# Patient Record
Sex: FEMALE | Race: BLACK OR AFRICAN AMERICAN | NOT HISPANIC OR LATINO | URBAN - METROPOLITAN AREA
[De-identification: names, ages, dates, MRNs, and addresses within clinical notes are randomized per-mention and may not be internally consistent; named-entity substitution may affect disease eponyms.]

---

## 2017-06-21 ENCOUNTER — HOSPITAL ENCOUNTER (OUTPATIENT)
Dept: RADIOLOGY | Facility: MEDICAL CENTER | Age: 47
End: 2017-06-21
Attending: OBSTETRICS & GYNECOLOGY
Payer: COMMERCIAL

## 2017-06-21 DIAGNOSIS — Z12.31 VISIT FOR SCREENING MAMMOGRAM: ICD-10-CM

## 2017-06-21 PROCEDURE — G0202 SCR MAMMO BI INCL CAD: HCPCS

## 2017-07-07 ENCOUNTER — OFFICE VISIT (OUTPATIENT)
Dept: URGENT CARE | Facility: PHYSICIAN GROUP | Age: 47
End: 2017-07-07
Payer: COMMERCIAL

## 2017-07-07 VITALS
WEIGHT: 165 LBS | OXYGEN SATURATION: 98 % | HEIGHT: 67 IN | HEART RATE: 78 BPM | RESPIRATION RATE: 16 BRPM | TEMPERATURE: 98.4 F | DIASTOLIC BLOOD PRESSURE: 78 MMHG | BODY MASS INDEX: 25.9 KG/M2 | SYSTOLIC BLOOD PRESSURE: 116 MMHG

## 2017-07-07 DIAGNOSIS — T14.8XXA ABRASION OF SKIN: ICD-10-CM

## 2017-07-07 PROCEDURE — 99203 OFFICE O/P NEW LOW 30 MIN: CPT | Performed by: PHYSICIAN ASSISTANT

## 2017-07-07 RX ORDER — TRIAMCINOLONE ACETONIDE 1 MG/G
CREAM TOPICAL
Qty: 1 TUBE | Refills: 0 | Status: SHIPPED | OUTPATIENT
Start: 2017-07-07

## 2017-07-07 ASSESSMENT — ENCOUNTER SYMPTOMS
CHILLS: 0
FEVER: 0
ABDOMINAL PAIN: 0
DIZZINESS: 0
SHORTNESS OF BREATH: 0
NAUSEA: 0
DIARRHEA: 0
ROS SKIN COMMENTS: SKIN ABRASION
MUSCULOSKELETAL NEGATIVE: 1
COUGH: 0
VOMITING: 0
HEADACHES: 0

## 2017-07-07 NOTE — MR AVS SNAPSHOT
"Lynn Melgar   2017 12:45 PM   Office Visit   MRN: [id number]    Department:  [facility]   Dept Phone:  [phone]    Description:  Female : 1970   Provider:  Lily Goyal PA-C           Reason for Visit     Foreign Body in Skin poss FB on neck since       Allergies as of 2017     No Known Allergies      You were diagnosed with     Dermatitis   [226997]         Vital Signs     Blood Pressure Pulse Temperature Respirations Height Weight    116/78 mmHg 78 36.9 °C (98.4 °F) 16 1.702 m (5' 7\") 74.844 kg (165 lb)    Body Mass Index Oxygen Saturation Smoking Status             25.84 kg/m2 98% Never Smoker          Basic Information     Date Of Birth Sex Race Ethnicity Preferred Language    1970 Female Black or  Non- English      Your appointments     Aug 11, 2017 10:30 AM   Annual Exam with Brigette Gould M.D.   [facility] [address]  [facility] [address]   [phone]              Health Maintenance        Date Due Completion Dates    IMM DTaP/Tdap/Td Vaccine (1 - Tdap) 3/10/1987 ---    IMM INFLUENZA (1) 2017 ---    MAMMOGRAM 2018, 2016, 2015    PAP SMEAR 2019, 2015            Current Immunizations     No immunizations on file.      Below and/or attached are the medications your provider expects you to take. Review all of your home medications and newly ordered medications with your provider and/or pharmacist. Follow medication instructions as directed by your provider and/or pharmacist. Please keep your medication list with you and share with your provider. Update the information when medications are discontinued, doses are changed, or new medications (including over-the-counter products) are added; and carry medication information at all times in the event of emergency situations     Allergies:  No Known Allergies          "   Medications  Valid as of: July 07, 2017 -  1:22 PM    Generic Name Brand Name Tablet Size Instructions for use    Triamcinolone Acetonide (Cream) KENALOG 0.1 % Apply thin film to affected areas twice daily for 1-2 weeks or until resolved        .                 Medicines prescribed today were sent to:     [facility] [id number] [place] [address], [place] - [address] [facility] [address] AT [address] [place] [address]    Phone: [phone] Fax: [phone]    Open 24 Hours?: No      Medication refill instructions:       If your prescription bottle indicates you have medication refills left, it is not necessary to call your provider’s office. Please contact your pharmacy and they will refill your medication.    If your prescription bottle indicates you do not have any refills left, you may request refills at any time through one of the following ways: The online CentrePath system (except Urgent Care), by calling your provider’s office, or by asking your pharmacy to contact your provider’s office with a refill request. Medication refills are processed only during regular business hours and may not be available until the next business day. Your provider may request additional information or to have a follow-up visit with you prior to refilling your medication.   *Please Note: Medication refills are assigned a new Rx number when refilled electronically. Your pharmacy may indicate that no refills were authorized even though a new prescription for the same medication is available at the pharmacy. Please request the medicine by name with the pharmacy before contacting your provider for a refill.           CentrePath Access Code: [email or web address]  Expires: 8/6/2017  1:22 PM    CentrePath  A secure, online tool to manage your health information     [employer]’s CentrePath® is a secure, online tool that connects you to your personalized health information from the privacy of your home -- day or night - making  it very easy for you to manage your healthcare. Once the activation process is completed, you can even access your medical information using the "[employer]" nelli, which is available for free in the Apple Nelli store or Google Play store.     "[employer]" provides the following levels of access (as shown below):   My Chart Features   [facility] Doctor [facility]  Specialists [facility]  Urgent  Care Non-[facility]  Primary Care  Doctor   Email your healthcare team securely and privately 24/7 X X X    Manage appointments: schedule your next appointment; view details of past/upcoming appointments X      Request prescription refills. X      View recent personal medical records, including lab and immunizations X X X X   View health record, including health history, allergies, medications X X X X   Read reports about your outpatient visits, procedures, consult and ER notes X X X X   See your discharge summary, which is a recap of your hospital and/or ER visit that includes your diagnosis, lab results, and care plan. X X       How to register for "[employer]":  1. Go to  [email or web address].  2. Click on the Sign Up Now box, which takes you to the New Member Sign Up page. You will need to provide the following information:  a. Enter your "[employer]" Access Code exactly as it appears at the top of this page. (You will not need to use this code after you’ve completed the sign-up process. If you do not sign up before the expiration date, you must request a new code.)   b. Enter your date of birth.   c. Enter your home email address.   d. Click Submit, and follow the next screen’s instructions.  3. Create a "[employer]" ID. This will be your "[employer]ie[employer]" login ID and cannot be changed, so think of one that is secure and easy to remember.  4. Create a "[employer]" password. You can change your password at any time.  5. Enter your Password Reset Question and Answer. This can be used at a later time if you forget your password.   6. Enter your e-mail address.  This allows you to receive e-mail notifications when new information is available in Voxy.  7. Click Sign Up. You can now view your health information.    For assistance activating your Voxy account, call [phone]

## 2017-07-07 NOTE — PROGRESS NOTES
"Subjective:      Lynn Melgar is a 47 y.o. female who presents with Foreign Body in Skin        Patient is accompanied by her .     Foreign Body in Skin  Pertinent negatives include no abdominal pain, chest pain, chills, coughing, fever, headaches, nausea or vomiting.   Patient presents to urgent care reporting an unusual skin lesion on her anterior neck. She states she first noticed in 1 weeks ago and described it as a \"scab\", and believes she scratched herself with something. Over the past week she believes the area has been extending down towards the chest and fells as if there is a foreign body in the skin. She also expresses concern for a parasite. [place] being out in the woods hiking or swimming in lakes. She is worried because she hugged relative that she hasn't seen in a while. No known skin conditions. Denies any pain, redness, or swelling to the site. Denies any recent trauma to the area.     Review of Systems   Constitutional: Negative for fever and chills.   Respiratory: Negative for cough and shortness of breath.    Cardiovascular: Negative for chest pain.   Gastrointestinal: Negative for nausea, vomiting, abdominal pain and diarrhea.   Genitourinary: Negative.    Musculoskeletal: Negative.    Skin:        Skin abrasion   Neurological: Negative for dizziness and headaches.          Objective:     /78 mmHg  Pulse 78  Temp(Src) 36.9 °C (98.4 °F)  Resp 16  Ht 1.702 m (5' 7\")  Wt 74.844 kg (165 lb)  BMI 25.84 kg/m2  SpO2 98%     Physical Exam   Constitutional: She is oriented to person, place, and time. She appears well-developed and well-nourished. No distress.   HENT:   Head: Normocephalic and atraumatic.   Eyes: Conjunctivae are normal. Pupils are equal, round, and reactive to light. Right eye exhibits no discharge. Left eye exhibits no discharge.   Neck: Normal range of motion.   Cardiovascular: Normal rate, regular rhythm and " "normal heart sounds.    No murmur heard.  Pulmonary/Chest: Effort normal.   Musculoskeletal: Normal range of motion.   Neurological: She is alert and oriented to person, place, and time.   Skin: Skin is warm and dry. She is not diaphoretic.        Very superficial, upside down \"V\" shaped abrasion present on anterior neck. No surrounding erythema, edema or discharge from the site. No obvious foreign bodies noted. No overlying scaling. Non-tender.    Psychiatric: She has a normal mood and affect. Her behavior is normal.   Nursing note and vitals reviewed.           Medications, Allergies, and current problem list reviewed today in Epic     Assessment/Plan:     1. Abrasion of skin    - triamcinolone acetonide (KENALOG) 0.1 % Cream; Apply thin film to affected areas twice daily for 1-2 weeks or until resolved  Dispense: 1 Tube; Refill: 0    Advised patient I have a very low suspicion of foreign body in the skin due to lack of history being outside or having any sort of trauma to the area. It appears to be a superficial linear abrasion without any signs of infection. No sign of fungal or parasitic infection. Advised patient there is no indication to perform an ultrasound at this time. Will try trial of topical steroids to monitor response. RTC in 1-2 weeks if lesion appears to be \"growing\" or worsening. Encouraged to take a picture of the area today for comparison in the future if needed. The patient demonstrated a good understanding and agreed with the treatment plan.      "

## 2017-08-11 ENCOUNTER — GYNECOLOGY VISIT (OUTPATIENT)
Dept: OBGYN | Facility: CLINIC | Age: 47
End: 2017-08-11
Payer: COMMERCIAL

## 2017-08-11 ENCOUNTER — HOSPITAL ENCOUNTER (OUTPATIENT)
Facility: MEDICAL CENTER | Age: 47
End: 2017-08-11
Attending: OBSTETRICS & GYNECOLOGY
Payer: COMMERCIAL

## 2017-08-11 VITALS
SYSTOLIC BLOOD PRESSURE: 110 MMHG | HEIGHT: 67 IN | WEIGHT: 175 LBS | DIASTOLIC BLOOD PRESSURE: 70 MMHG | BODY MASS INDEX: 27.47 KG/M2

## 2017-08-11 DIAGNOSIS — Z01.419 WELL FEMALE EXAM WITH ROUTINE GYNECOLOGICAL EXAM: ICD-10-CM

## 2017-08-11 DIAGNOSIS — Z12.4 SCREENING FOR MALIGNANT NEOPLASM OF CERVIX: ICD-10-CM

## 2017-08-11 DIAGNOSIS — Z11.51 SCREENING FOR HUMAN PAPILLOMAVIRUS: ICD-10-CM

## 2017-08-11 PROCEDURE — 87624 HPV HI-RISK TYP POOLED RSLT: CPT

## 2017-08-11 PROCEDURE — 88175 CYTOPATH C/V AUTO FLUID REDO: CPT

## 2017-08-11 PROCEDURE — 99396 PREV VISIT EST AGE 40-64: CPT | Performed by: OBSTETRICS & GYNECOLOGY

## 2017-08-11 NOTE — MR AVS SNAPSHOT
"Lynn Melgar   2017 10:30 AM   Gynecology Visit   MRN: [id number]    Department:  [facility]   Dept Phone:  [phone]    Description:  Female : 1970   Provider:  Brigette Gould M.D.           Reason for Visit     Annual Exam           Allergies as of 2017     No Known Allergies      You were diagnosed with     Well female exam with routine gynecological exam   [375526]       Screening for malignant neoplasm of cervix   [073785]       Screening for human papillomavirus   [346822]         Vital Signs     Blood Pressure Height Weight Body Mass Index Last Menstrual Period Breastfeeding?    110/70 mmHg 1.702 m (5' 7\") 79.379 kg (175 lb) 27.40 kg/m2 2017 No    Smoking Status                   Never Smoker            Basic Information     Date Of Birth Sex Race Ethnicity Preferred Language    1970 Female Black or  Non- English      Health Maintenance        Date Due Completion Dates    IMM DTaP/Tdap/Td Vaccine (1 - Tdap) 3/10/1987 ---    IMM INFLUENZA (1) 2017 ---    MAMMOGRAM 2018, 2016, 2015    PAP SMEAR 2019, 2015            Current Immunizations     No immunizations on file.      Below and/or attached are the medications your provider expects you to take. Review all of your home medications and newly ordered medications with your provider and/or pharmacist. Follow medication instructions as directed by your provider and/or pharmacist. Please keep your medication list with you and share with your provider. Update the information when medications are discontinued, doses are changed, or new medications (including over-the-counter products) are added; and carry medication information at all times in the event of emergency situations     Allergies:  No Known Allergies          Medications  Valid as of: 2017 - 11:10 AM    Generic Name Brand Name Tablet Size Instructions for use   " Triamcinolone Acetonide (Cream) KENALOG 0.1 % Apply thin film to affected areas twice daily for 1-2 weeks or until resolved        .                 Medicines prescribed today were sent to:     [facility] [id number]  [place], [place] - [address] [facility] [address] AT [facility]    [address] [facility] [place] [address]    Phone: [phone] Fax: [phone]    Open 24 Hours?: No      Medication refill instructions:       If your prescription bottle indicates you have medication refills left, it is not necessary to call your provider’s office. Please contact your pharmacy and they will refill your medication.    If your prescription bottle indicates you do not have any refills left, you may request refills at any time through one of the following ways: The online SCM-GL system (except Urgent Care), by calling your provider’s office, or by asking your pharmacy to contact your provider’s office with a refill request. Medication refills are processed only during regular business hours and may not be available until the next business day. Your provider may request additional information or to have a follow-up visit with you prior to refilling your medication.   *Please Note: Medication refills are assigned a new Rx number when refilled electronically. Your pharmacy may indicate that no refills were authorized even though a new prescription for the same medication is available at the pharmacy. Please request the medicine by name with the pharmacy before contacting your provider for a refill.        Your To Do List     Future Labs/Procedures Complete By Expires    THINPREP PAP WITH HPV  As directed 8/11/2018         SCM-GL Access Code: [email or web address]  Expires: 9/8/2017  4:10 AM    SCM-GL  A secure, online tool to manage your health information     [employer]’s SCM-GL® is a secure, online tool that connects you to your personalized health information from the privacy of your home -- day or night - making  it very easy for you to manage your healthcare. Once the activation process is completed, you can even access your medical information using the RevTrax nelli, which is available for free in the Apple Nelli store or Google Play store.     RevTrax provides the following levels of access (as shown below):   My Chart Features   [facility] Doctor [facility]  Specialists [facility]  Urgent  Care Non-[facility]  Primary Care  Doctor   Email your healthcare team securely and privately 24/7 X X X    Manage appointments: schedule your next appointment; view details of past/upcoming appointments X      Request prescription refills. X      View recent personal medical records, including lab and immunizations X X X X   View health record, including health history, allergies, medications X X X X   Read reports about your outpatient visits, procedures, consult and ER notes X X X X   See your discharge summary, which is a recap of your hospital and/or ER visit that includes your diagnosis, lab results, and care plan. X X       How to register for RevTrax:  1. Go to  [email or web address].  2. Click on the Sign Up Now box, which takes you to the New Member Sign Up page. You will need to provide the following information:  a. Enter your RevTrax Access Code exactly as it appears at the top of this page. (You will not need to use this code after you’ve completed the sign-up process. If you do not sign up before the expiration date, you must request a new code.)   b. Enter your date of birth.   c. Enter your home email address.   d. Click Submit, and follow the next screen’s instructions.  3. Create a RevTrax ID. This will be your RevTrax login ID and cannot be changed, so think of one that is secure and easy to remember.  4. Create a RevTrax password. You can change your password at any time.  5. Enter your Password Reset Question and Answer. This can be used at a later time if you forget your password.   6. Enter your e-mail address.  This allows you to receive e-mail notifications when new information is available in HiperScan.  7. Click Sign Up. You can now view your health information.    For assistance activating your HiperScan account, call [phone]

## 2017-08-11 NOTE — PROGRESS NOTES
"Lynn Melgar47 y.o.  female presents for Annual Well Woman Exam.   Patient is currently without complaints. Patient is   Having normal meses with last menstrual period 1 week ago.  Patient reports regular monthly menstrual cycles without intermenstrual spotting. She states the periods are not heavy or painful, she is not having any symptoms related to menopause no hot flashes vaginal dryness pelvic pain or pressure           ROS: Patient is feeling well. No dyspnea or chest pain on exertion. No Abdominal pain, change in bowel habits, black or bloody stools. No urinary sx. GYN ROS:normal menses, no abnormal bleeding, pelvic pain or discharge, no breast pain or new or enlarging lumps on self exam. Denies breast tenderness, mass, discharge, changes in size or contour, or abnormal cyclic discomfort. No neurological complaints.  Past Medical History   Diagnosis Date   • Anemia    • Ulcer (CMS-HCC)      as a child     salpingectomy with tubal ligation on the other tube  Allergy:   Review of patient's allergies indicates no known allergies.    LMP:       Patient's last menstrual period was 2017. .     Menstrual History: menses regular every 30 days      Pap history, the patient denies abnormal Pap smears and denies   sexually transmitted diseases    Mammo:recent    Objective : The patient appears well, alert and oriented x 3, in no acute distress.  Blood pressure 110/70, height 1.702 m (5' 7\"), weight 79.379 kg (175 lb), last menstrual period 2017, not currently breastfeeding.  HEENT Exam: EOMI, ANETTE, no adenopathy or thyromegaly.  Lungs: Clear to Auscultation   Cor: S1 and S2 normal, no murmurs, or rubs   Abdomen: Soft without tenderness, guarding mass or organomegaly.  Extremities: No edema, pulses equal  Neurological: Normal No focal signs  Breast Exam:Inspection negative. No nipple discharge or bleeding. No masses or nodularity palpable  Pelvic: negative findings: external genitalia " normal, Bartholin's glands, urethra, Ridgetop's glands negative, vaginal mucosa normal, cervix clear, normal sized uterus, adnexae negative    Lab:No results found for this or any previous visit (from the past 336 hour(s)).    Assessment:  well woman    Plan:mammogram  pap smear  return annually or prn

## 2017-08-12 LAB
CYTOLOGY REG CYTOL: NORMAL
HPV HR 12 DNA CVX QL NAA+PROBE: NEGATIVE
HPV16 DNA SPEC QL NAA+PROBE: NEGATIVE
HPV18 DNA SPEC QL NAA+PROBE: NEGATIVE
SPECIMEN SOURCE: NORMAL

## 2018-06-22 ENCOUNTER — HOSPITAL ENCOUNTER (OUTPATIENT)
Dept: RADIOLOGY | Facility: MEDICAL CENTER | Age: 48
End: 2018-06-22
Attending: OBSTETRICS & GYNECOLOGY
Payer: COMMERCIAL

## 2018-06-22 DIAGNOSIS — Z12.31 SCREENING MAMMOGRAM, ENCOUNTER FOR: ICD-10-CM

## 2018-06-22 PROCEDURE — 77067 SCR MAMMO BI INCL CAD: CPT
